# Patient Record
Sex: FEMALE | Race: WHITE
[De-identification: names, ages, dates, MRNs, and addresses within clinical notes are randomized per-mention and may not be internally consistent; named-entity substitution may affect disease eponyms.]

---

## 2018-03-14 ENCOUNTER — HOSPITAL ENCOUNTER (EMERGENCY)
Dept: HOSPITAL 59 - ER | Age: 28
Discharge: HOME | End: 2018-03-14
Payer: SELF-PAY

## 2018-03-14 DIAGNOSIS — T22.212A: Primary | ICD-10-CM
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
## 2018-03-14 DIAGNOSIS — Z87.891: ICD-10-CM
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
## 2018-03-14 PROCEDURE — 96372 THER/PROPH/DIAG INJ SC/IM: CPT

## 2018-03-14 PROCEDURE — 99283 EMERGENCY DEPT VISIT LOW MDM: CPT

## 2018-03-14 PROCEDURE — 90715 TDAP VACCINE 7 YRS/> IM: CPT

## 2018-03-14 RX ADMIN — TETANUS TOXOID, REDUCED DIPHTHERIA TOXOID AND ACELLULAR PERTUSSIS VACCINE, ADSORBED ONE ML: 5; 2.5; 8; 8; 2.5 SUSPENSION INTRAMUSCULAR at 11:39

## 2018-03-14 RX ADMIN — IBUPROFEN ONE MG: 600 TABLET ORAL at 11:38

## 2018-03-14 RX ADMIN — SILVER SULFADIAZINE ONE: 10 CREAM TOPICAL at 12:28

## 2018-03-14 NOTE — EMERGENCY DEPARTMENT RECORD
History of Present Illness





- General


Chief complaint: Burn/Smoke Inhalation


Stated complaint: BURN ON LEFT ARM


Time Seen by Provider: 18 11:12


Source: Patient, RN notes reviewed


Mode of Arrival: Ambulatory





- History of Present Illness


Initial comments: 


grease burn to the left forearm from 30 minutes ago while working at [employer] 

[employer]. No blisters and red with some swelling.





Onset/Timin


-: Minutes(s)





- Related Data


 Home Medications











 Medication  Instructions  Recorded  Confirmed  Last Taken


 


No Home Med [NO HOME MEDS]  18 Unknown











 Allergies











Allergy/AdvReac Type Severity Reaction Status Date / Time


 


acetaminophen [From Vicodin] Allergy  VOMITING Verified 18 11:03


 


codeine Allergy  SWELLING Verified 18 11:03





   (GENERAL)  


 


hydrocodone Allergy  SWELLING Verified 18 11:03





   (GENERAL)  


 


sulfamethoxazole Allergy  SWELLING Verified 18 11:03





[From Bactrim]   (GENERAL)  


 


trimethoprim [From Bactrim] Allergy  SWELLING Verified 18 11:03





   (GENERAL)  














Travel Screening





- Travel/Exposure Within Last 30 Days


Have you traveled within the last 30 days?: No





- Travel/Exposure Within Last Year


Have you traveled outside the [place] in the last year?: No





- Additonal Travel Details


Have you been exposed to anyone with a communicable illness?: No





- Travel Symptoms


Symptom Screening: None





Review of Systems


Reviewed: No additional complaints except as noted below


Constitutional: Reports: As per HPI.  Denies: Chills, Fever, Malaise, Night 

sweats, Weakness, Weight change


Eyes: Reports: As per HPI.  Denies: Eye discharge, Eye pain, Photophobia, 

Vision change


ENT: Reports: As per HPI.  Denies: Congestion, Dental pain, Ear pain, Epistaxis

, Hearing loss, Throat pain


Respiratory: Reports: As per HPI.  Denies: Cough, Dyspnea, Hemoptysis, Stridor, 

Wheezes


Cardiovascular: Reports: As per HPI.  Denies: Arrhythmia, Chest pain, Dyspnea 

on exertion, Edema, Murmurs, Orthopnea, Palpitations, Paroxysmal nocturnal 

dyspnea, Rheumatic Fever, Syncope


Endocrine: Reports: As per HPI.  Denies: Fatigue, Heat or cold intolerance, 

Polydipsia, Polyuria


Gastrointestinal: Reports: As per HPI.  Denies: Abdominal pain, Constipation, 

Diarrhea, Hematemesis, Hematochezia, Melena, Nausea, Vomiting


Genitourinary: Reports: As per HPI.  Denies: Abnormal menses, Discharge, 

Dyspareunia, Dysuria, Frequency, Hematuria, Incontinence, Retention, Urgency


Musculoskeletal: Reports: As per HPI.  Denies: Arthralgia, Back pain, Gout, 

Joint swelling, Myalgia, Neck pain


Skin: Reports: As per HPI, Other (burn).  Denies: Bruising, Change in color, 

Change in hair/nails, Lesions, Pruritus, Rash


Neurological: Reports: As per HPI.  Denies: Abnormal gait, Confusion, Headache, 

Numbness, Paresthesias, Seizure, Tingling, Tremors, Vertigo, Weakness


Psychiatric: Reports: As per HPI.  Denies: Anxiety, Auditory hallucinations, 

Depression, Homicidal thoughts, Suicidal thoughts, Visual hallucinations


Hematological/Lymphatic: Reports: As per HPI.  Denies: Anemia, Blood Clots, 

Easy bleeding, Easy bruising, Swollen glands





Past Medical History





- SOCIAL HISTORY


Smoking Status: Former smoker


Alcohol Use: None


Drug Use: None





- RESPIRATORY


Hx Respiratory Disorders: No





- CARDIOVASCULAR


Hx Cardio Disorders: No





- NEURO


Hx Neuro Disorders: No





- GI


Hx GI Disorders: No





- 


Hx Genitourinary Disorders: No





- ENDOCRINE


Hx Endocrine Disorders: No





- MUSCULOSKELETAL


Hx Musculoskeletal Disorders: No





- PSYCH


Hx Psych Problems: No





- HEMATOLOGY/ONCOLOGY


Hx Hematology/Oncology Disorders: No





Family Medical History


Any Significant Family History?: No





Physical Exam





- General


General Appearance: Alert, Oriented x3, Cooperative, No acute distress





- Head


Head exam: Normal inspection





- Eye


Eye exam: Normal appearance, PERRL


Pupils: Normal accommodation





- ENT


ENT exam: Normal exam, Mucous membranes moist, Normal external ear exam, Normal 

orophraynx, TM's normal bilaterally


Ear exam: Normal external inspection.  negative: External canal tenderness


Nasal Exam: Normal inspection.  negative: Discharge, Sinus tenderness


Mouth exam: Normal external inspection, Tongue normal


Teeth exam: Normal inspection.  negative: Dental caries


Throat exam: Normal inspection.  negative: Tonsillar erythema, Tonsillar exudate





- Neck


Neck exam: Normal inspection, Full ROM.  negative: Tenderness





- Respiratory


Respiratory exam: Normal lung sounds bilaterally.  negative: Respiratory 

distress





- Cardiovascular


Cardiovascular Exam: Regular rate, Normal rhythm, Normal heart sounds





- GI/Abdominal


GI/Abdominal exam: Soft, Normal bowel sounds.  negative: Tenderness





- Rectal


Rectal exam: Deferred





- 


 exam: Deferred





- Extremities


Extremities exam: Normal inspection, Full ROM, Normal capillary refill.  

negative: Tenderness





- Back


Back exam: Reports: Normal inspection, Full ROM.  Denies: Muscle spasm, Rash 

noted, Tenderness





- Neurological


Neurological exam: Alert, Normal gait, Oriented X3, Reflexes normal





- Psychiatric


Psychiatric exam: Normal affect, Normal mood





- Skin


Skin exam: Other (burn on the left forearm 2% TBA , no blisters yet,)





Course





 Vital Signs











  18





  11:01


 


Temperature 98.4 F


 


Pulse Rate 67


 


Respiratory 18





Rate 


 


Blood Pressure 134/95


 


Pulse Ox 96














Disposition


Clinical Impression: 


Burn of arm


Qualifiers:


 Encounter type: initial encounter Upper extremity location: forearm Laterality

: left Burn degree: partial thickness (2nd degree) Qualified Code(s): T22.212A 

- Burn of second degree of left forearm, initial encounter





Disposition: Home, Self-Care


Condition: (1) Good


Instructions:  Second Degree Burn (ED)


Additional Instructions: 


wash twice a day with polina dish soap. apply neosporin twice a day


motrin for pain 2-3 pills every 6 hours 


burn recheck with  in 2 days or return to ED.


Forms:  Patient Portal Access


Time of Disposition: 12:26





Quality





- Quality Measures


Quality Measures: N/A





- Blood Pressure Screening


Does Patient Have Any of the Following: No


Blood Pressure Classification: Hypertensive Reading


Systolic Measurement: 134


Diastolic Measurement: 95


Screening for High Blood Pressure: < Pre-Hypertensive BP, F/U Documented > [

]


Pre-Hypertensive Follow-up Interventions: Referral to alternative/primary care 

provider.